# Patient Record
Sex: FEMALE | Race: WHITE | NOT HISPANIC OR LATINO | URBAN - METROPOLITAN AREA
[De-identification: names, ages, dates, MRNs, and addresses within clinical notes are randomized per-mention and may not be internally consistent; named-entity substitution may affect disease eponyms.]

---

## 2017-03-22 NOTE — PATIENT DISCUSSION
YELITZA OU:  PRESCRIBE ARTIFICIAL TEARS BID - QID. DECREASE OUTDOOR EXPOSURE AND USE UV PROTECTION/ SUNGLASSES WITH OUTDOOR ACTIVITIES. RETURN FOR FOLLOW UP AS SCHEDULED.

## 2017-03-22 NOTE — PATIENT DISCUSSION
Pinguecula Counseling:  I have explained to the patient at length the diagnosis of pinguecula and its pathophysiology. I recommended the patient adequately protect their eyes from excessive UV light and dry, jaime conditions. The use of artificial tears in dry conditions was encouraged. Return for follow-up as scheduled.

## 2017-12-28 NOTE — PATIENT DISCUSSION
POSTERIOR VITREOUS DETACHMENT WITH VITREOUS FLOATERS, OU: PROMINENT FLOATERS OS. NO HOLES OR NO TEARS 360' WITH INDIRECT EXAM, OU. F&amp;F PRECAUTIONS REVIEWED WITH THE PATIENT. RETURN  AS SCHEDULED.

## 2018-02-14 NOTE — PATIENT DISCUSSION
RETINA IS ATTACHED OS: PVD OS; NO DEFINITE SIGNS OF AMD; NO HOLES OR TEARS SEEN ON DILATED EXAM TODAY.  RETINAL DETACHMENT SIGNS AND SYMPTOMS REVIEWED

## 2018-08-21 NOTE — PATIENT DISCUSSION
Continue: PreserVision AREDS 2 (vit c,k-gk-xsmpk-lutein-zeaxan): capsule: [id number] mg-unit-mg-mg 1 capsule twice a day as directed by mouth

## 2018-08-21 NOTE — PATIENT DISCUSSION
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 RECOMMENDS OMEGA-3 FISH OIL WITH PRIMARY CARE PHYSICIANS APPROVAL. RETURN FOR FOLLOW-UP AS SCHEDULED OR SOONER IF SYMPTOMS WORSEN.

## 2018-08-21 NOTE — PATIENT DISCUSSION
EPIRETINAL MEMBRANE, OS. NEWLY DIAGNOSED TODAY. VISUALLY SIGNIFICANT. REFER TO RETINA SPECIALIST FOR EVALUATION AND TREATMENT.

## 2018-08-21 NOTE — PATIENT DISCUSSION
Epiretinal Membrane Counseling: The diagnosis and natural history of epiretinal membrane was discussed with the patient including the risk of progression with retinal traction resulting in visual distortion. Patient instructed on how to do [address] to check for distortion in vision, The patient is instructed to call back immediately if any vision changes, and keep follow up as scheduled.

## 2018-08-21 NOTE — PATIENT DISCUSSION
DERMATOCHALASIS / PTOSIS RUL AND KARMA :  VISUALLY SIGNIFICANT TO PATIENT. SCHEDULE WITH OCULOPLASTIC SPECIALIST IF PATIENT DESIRES.

## 2018-08-21 NOTE — PATIENT DISCUSSION
VITREOUS SYNERESIS OD: NO HOLES / TEARS SEEN ON DILATED EXAM. RD SIGNS AND SYMPTOMS REVIEWED. REASSURED PATIENT. RECOMMENDED PATIENT CALL IF ANY INCREASE OF FLASHES/FLOATERS.

## 2018-08-21 NOTE — PATIENT DISCUSSION
MACULAR HOLE, OS: NEWLY DIAGNOSED TODAY. REFER TO RETINAL SPECIALIST, DR. Tyrese Vieira, FOR EVALUATION AND TREATMENT. RETURN FOR FOLLOW-UP AS SCHEDULED.

## 2018-08-29 NOTE — PATIENT DISCUSSION
Continue: PreserVision AREDS 2 (vit c,t-fu-bspyk-lutein-zeaxan): capsule: 964-105-34-0 mg-unit-mg-mg 1 capsule twice a day as directed by mouth

## 2018-08-29 NOTE — PATIENT DISCUSSION
Macular Drusen :   I have explained the diagnosis of macular drusen and the role of drusen in development of macular degeneration. UV protection when outdoors and a nutritious diet, especially green leafy vegetables and fish to minimize the risk of developing macular degeneration. The patient was advised of the importance of annual dilated eye exams. Return for follow-up as scheduled.

## 2019-02-27 NOTE — PATIENT DISCUSSION
RETINA IS ATTACHED OU: PVD OS; NO HOLES OR TEARS SEEN ON DILATED EXAM TODAY.  RETINAL DETACHMENT SIGNS AND SYMPTOMS REVIEWED

## 2019-06-09 NOTE — PATIENT DISCUSSION
EPIMACULAR MEMBRANE, OS:  PATIENT NOT FUNCTIONALLY BOTHERED. SURGERY NOT RECOMMENDED AT THIS TIME. RETURN AS SCHEDULED FOR OCT / EVALUATION. present

## 2020-08-18 ENCOUNTER — IMPORTED ENCOUNTER (OUTPATIENT)
Dept: URBAN - METROPOLITAN AREA CLINIC 31 | Facility: CLINIC | Age: 45
End: 2020-08-18

## 2020-08-18 PROCEDURE — V2520 CONTACT LENS HYDROPHILIC: HCPCS

## 2020-08-18 PROCEDURE — 92133 CPTRZD OPH DX IMG PST SGM ON: CPT

## 2020-08-18 PROCEDURE — 92250 FUNDUS PHOTOGRAPHY W/I&R: CPT

## 2020-08-18 PROCEDURE — 92310 CONTACT LENS FITTING OU: CPT

## 2020-08-18 PROCEDURE — 92004 COMPRE OPH EXAM NEW PT 1/>: CPT

## 2020-08-18 NOTE — PATIENT DISCUSSION
1.  Refractive error - Continue present contact lens modality but update power OD Glasses change optional. .  Stop/wear and call if any redness pain or decrease in vision occur. 2.  Optic nerve coloboma OU - monitor3. Return for an appointment in 1 year for comprehensive exam. with Dr. Adriana Munoz.

## 2020-09-03 NOTE — PATIENT DISCUSSION
Epiretinal Membrane Counseling: The diagnosis and natural history of epiretinal membrane was discussed with the patient including the risk of progression with retinal traction resulting in visual distortion. Patient instructed on how to do [address] to check for distortion in vision, The patient is instructed to call back immediately if any vision changes, and keep follow up as scheduled.

## 2020-09-03 NOTE — PATIENT DISCUSSION
POSTERIOR VITREOUS DETACHMENT WITH VITREOUS FLOATERS, Os: NO HOLES OR NO TEARS 360' WITH INDIRECT EXAM, OU. F&amp;F PRECAUTIONS REVIEWED WITH THE PATIENT. RETURN  AS SCHEDULED.

## 2020-09-30 NOTE — PATIENT DISCUSSION
Posterior Vitreous Detachment: I have discussed the diagnosis of PVD with the patient and the possibility of a retinal tear or detachment. The signs/symptoms of a retinal tear/detachment were reviewed to include but not limited to redness, discharge, pain, increase or change in flashes of light, increase or change in floaters, decreased vision, part of the vision missing, veils or any other ocular concerns. I have further explained not all patients who develop a tear or detachment have notable symptoms, therefore, compliance with return visits are necessary. The patient was instructed to contact us immediately if they noticed any of the signs or symptoms of retinal detachment as noted above as the prognosis for any potential treatment options may be time related. Return for follow-up as scheduled. The patient is a 8y11m Female complaining of fever.

## 2021-08-26 ENCOUNTER — IMPORTED ENCOUNTER (OUTPATIENT)
Dept: URBAN - METROPOLITAN AREA CLINIC 31 | Facility: CLINIC | Age: 46
End: 2021-08-26

## 2021-08-26 PROCEDURE — 92310 CONTACT LENS FITTING OU: CPT

## 2021-08-26 PROCEDURE — 92014 COMPRE OPH EXAM EST PT 1/>: CPT

## 2021-08-26 NOTE — PATIENT DISCUSSION
1.  Refractive error - Continue present contact lens modality. Glasses change optional. Stop/wear and call if any redness pain or decrease in vision occur. 2.  Optic nerve coloboma OU - monitor3. Return for an appointment in 1 year for comprehensive exam. with Dr. Adriana Munoz.

## 2021-08-26 NOTE — PATIENT DISCUSSION
1.  Refractive error - Continue present contact lens modality but update power OD Glasses change optional. .  Stop/wear and call if any redness pain or decrease in vision occur. 2.  Optic nerve coloboma OU - monitor3. Return for an appointment in 1 year for comprehensive exam. with Dr. Ruslan Carnes.

## 2022-04-02 ASSESSMENT — VISUAL ACUITY
OS_SC: 20/70
OD_SC: 20/30-2
OS_SC: 20/30
OS_SC: 20/20
OD_SC: 20/40
OD_SC: 20/40
OD_SC: 20/60
OD_GLARE: 20/30
OS_SC: 20/30-2

## 2022-04-02 ASSESSMENT — TONOMETRY
OD_IOP_MMHG: 20
OS_IOP_MMHG: 20

## 2022-08-26 ENCOUNTER — PREPPED CHART (OUTPATIENT)
Dept: URBAN - METROPOLITAN AREA CLINIC 29 | Facility: CLINIC | Age: 47
End: 2022-08-26

## 2022-08-29 ENCOUNTER — COMPREHENSIVE EXAM (OUTPATIENT)
Dept: URBAN - METROPOLITAN AREA CLINIC 29 | Facility: CLINIC | Age: 47
End: 2022-08-29

## 2022-08-29 DIAGNOSIS — H52.13: ICD-10-CM

## 2022-08-29 DIAGNOSIS — H47.313: ICD-10-CM

## 2022-08-29 DIAGNOSIS — H52.223: ICD-10-CM

## 2022-08-29 DIAGNOSIS — H04.123: ICD-10-CM

## 2022-08-29 PROCEDURE — 92014 COMPRE OPH EXAM EST PT 1/>: CPT

## 2022-08-29 PROCEDURE — 92310-6 LEVEL 6 CONTACT LENS MANAGEMENT

## 2022-08-29 ASSESSMENT — VISUAL ACUITY
OD_CC: J1
OS_CC: 20/30
OS_CC: J1
OD_CC: 20/30

## 2022-08-29 ASSESSMENT — TONOMETRY
OD_IOP_MMHG: 18
OS_IOP_MMHG: 17

## 2022-08-29 NOTE — PATIENT DISCUSSION
RGPs still fit well but do dry so use rewetting gtt more often and order new pair with adjusted hicks. To be seen when in.

## 2022-09-13 ENCOUNTER — CONTACT LENSES/GLASSES VISIT (OUTPATIENT)
Dept: URBAN - METROPOLITAN AREA CLINIC 29 | Facility: CLINIC | Age: 47
End: 2022-09-13

## 2022-09-13 DIAGNOSIS — H52.223: ICD-10-CM

## 2022-09-13 DIAGNOSIS — H52.13: ICD-10-CM

## 2022-09-13 PROCEDURE — 92310F

## 2022-09-13 NOTE — PATIENT DISCUSSION
RGPs still fit well but significant OR. Order new hicks OU for exchange. To be seen. Will need to wear readers over CLs PRN.

## 2022-09-22 ENCOUNTER — CONTACT LENSES/GLASSES VISIT (OUTPATIENT)
Dept: URBAN - METROPOLITAN AREA CLINIC 29 | Facility: CLINIC | Age: 47
End: 2022-09-22

## 2022-09-22 DIAGNOSIS — Z46.0: ICD-10-CM

## 2022-09-22 PROCEDURE — 92310F

## 2023-09-05 ENCOUNTER — COMPREHENSIVE EXAM (OUTPATIENT)
Dept: URBAN - METROPOLITAN AREA CLINIC 29 | Facility: CLINIC | Age: 48
End: 2023-09-05

## 2023-09-05 DIAGNOSIS — H52.13: ICD-10-CM

## 2023-09-05 DIAGNOSIS — H52.223: ICD-10-CM

## 2023-09-05 PROCEDURE — 92014 COMPRE OPH EXAM EST PT 1/>: CPT

## 2023-09-05 PROCEDURE — 92015 DETERMINE REFRACTIVE STATE: CPT

## 2023-09-05 PROCEDURE — 92310-2 LEVEL 2 CONTACT LENS MANAGEMENT

## 2023-09-05 ASSESSMENT — TONOMETRY
OS_IOP_MMHG: 24
OD_IOP_MMHG: 24

## 2023-09-05 ASSESSMENT — VISUAL ACUITY
OS_CC: 20/25-2
OD_CC: 20/30

## 2023-10-11 ENCOUNTER — ESTABLISHED PATIENT (OUTPATIENT)
Dept: URBAN - METROPOLITAN AREA CLINIC 29 | Facility: CLINIC | Age: 48
End: 2023-10-11

## 2023-10-11 DIAGNOSIS — H47.313: ICD-10-CM

## 2023-10-11 DIAGNOSIS — H40.023: ICD-10-CM

## 2023-10-11 PROCEDURE — 99214 OFFICE O/P EST MOD 30 MIN: CPT

## 2023-10-11 PROCEDURE — 92083 EXTENDED VISUAL FIELD XM: CPT

## 2023-10-11 RX ORDER — LATANOPROST 50 UG/ML
1 SOLUTION/ DROPS OPHTHALMIC EVERY EVENING
Start: 2023-10-11

## 2023-10-11 ASSESSMENT — TONOMETRY
OS_IOP_MMHG: 22
OD_IOP_MMHG: 22

## 2023-10-11 ASSESSMENT — VISUAL ACUITY
OD_CC: 20/40
OS_CC: 20/30

## 2023-11-06 ENCOUNTER — FOLLOW UP (OUTPATIENT)
Dept: URBAN - METROPOLITAN AREA CLINIC 29 | Facility: CLINIC | Age: 48
End: 2023-11-06

## 2023-11-06 DIAGNOSIS — Q14.8: ICD-10-CM

## 2023-11-06 DIAGNOSIS — H47.313: ICD-10-CM

## 2023-11-06 DIAGNOSIS — H40.023: ICD-10-CM

## 2023-11-06 PROCEDURE — 99213 OFFICE O/P EST LOW 20 MIN: CPT

## 2023-11-06 ASSESSMENT — TONOMETRY
OS_IOP_MMHG: 15
OD_IOP_MMHG: 14

## 2023-11-06 ASSESSMENT — VISUAL ACUITY
OD_CC: 20/25
OS_CC: 20/25

## 2024-04-09 ENCOUNTER — ESTABLISHED PATIENT (OUTPATIENT)
Dept: URBAN - METROPOLITAN AREA CLINIC 29 | Facility: CLINIC | Age: 49
End: 2024-04-09

## 2024-04-09 DIAGNOSIS — H47.313: ICD-10-CM

## 2024-04-09 DIAGNOSIS — H04.123: ICD-10-CM

## 2024-04-09 DIAGNOSIS — Q14.8: ICD-10-CM

## 2024-04-09 DIAGNOSIS — H40.023: ICD-10-CM

## 2024-04-09 PROCEDURE — 99213 OFFICE O/P EST LOW 20 MIN: CPT

## 2024-04-09 ASSESSMENT — VISUAL ACUITY: OD_PH: 20/30 +2

## 2024-04-09 ASSESSMENT — TONOMETRY
OS_IOP_MMHG: 18
OD_IOP_MMHG: 17

## 2024-09-10 ENCOUNTER — COMPREHENSIVE EXAM (OUTPATIENT)
Dept: URBAN - METROPOLITAN AREA CLINIC 29 | Facility: CLINIC | Age: 49
End: 2024-09-10

## 2024-09-10 DIAGNOSIS — H52.223: ICD-10-CM

## 2024-09-10 DIAGNOSIS — H52.13: ICD-10-CM

## 2024-09-10 DIAGNOSIS — H52.4: ICD-10-CM

## 2024-09-10 PROCEDURE — 92015 DETERMINE REFRACTIVE STATE: CPT

## 2024-09-10 PROCEDURE — 92310-2 LEVEL 2 CONTACT LENS MANAGEMENT: Mod: 21

## 2024-09-10 PROCEDURE — 92014 COMPRE OPH EXAM EST PT 1/>: CPT

## 2024-10-30 ENCOUNTER — FOLLOW UP (OUTPATIENT)
Dept: URBAN - METROPOLITAN AREA CLINIC 29 | Facility: CLINIC | Age: 49
End: 2024-10-30

## 2024-10-30 DIAGNOSIS — H47.313: ICD-10-CM

## 2024-10-30 DIAGNOSIS — H52.13: ICD-10-CM

## 2024-10-30 DIAGNOSIS — H40.023: ICD-10-CM

## 2024-10-30 PROCEDURE — 92083 EXTENDED VISUAL FIELD XM: CPT

## 2024-10-30 PROCEDURE — 99213 OFFICE O/P EST LOW 20 MIN: CPT

## 2025-05-01 ENCOUNTER — FOLLOW UP (OUTPATIENT)
Age: 50
End: 2025-05-01

## 2025-05-01 DIAGNOSIS — H40.023: ICD-10-CM

## 2025-05-01 DIAGNOSIS — H47.313: ICD-10-CM

## 2025-05-01 PROCEDURE — 99213 OFFICE O/P EST LOW 20 MIN: CPT
